# Patient Record
Sex: FEMALE | Race: BLACK OR AFRICAN AMERICAN | NOT HISPANIC OR LATINO | Employment: OTHER | URBAN - METROPOLITAN AREA
[De-identification: names, ages, dates, MRNs, and addresses within clinical notes are randomized per-mention and may not be internally consistent; named-entity substitution may affect disease eponyms.]

---

## 2017-01-01 ENCOUNTER — HOSPITAL ENCOUNTER (INPATIENT)
Facility: HOSPITAL | Age: 72
LOS: 1 days | DRG: 064 | End: 2017-02-20
Attending: EMERGENCY MEDICINE | Admitting: HOSPITALIST
Payer: COMMERCIAL

## 2017-01-01 VITALS
HEART RATE: 70 BPM | TEMPERATURE: 96 F | BODY MASS INDEX: 24.47 KG/M2 | RESPIRATION RATE: 18 BRPM | SYSTOLIC BLOOD PRESSURE: 91 MMHG | OXYGEN SATURATION: 91 % | DIASTOLIC BLOOD PRESSURE: 80 MMHG | WEIGHT: 155.88 LBS | HEIGHT: 67 IN

## 2017-01-01 DIAGNOSIS — I63.9 CVA (CEREBRAL VASCULAR ACCIDENT): ICD-10-CM

## 2017-01-01 DIAGNOSIS — E80.6 HYPERBILIRUBINEMIA: ICD-10-CM

## 2017-01-01 DIAGNOSIS — I63.9 CEREBROVASCULAR ACCIDENT (CVA), UNSPECIFIED MECHANISM: Primary | ICD-10-CM

## 2017-01-01 DIAGNOSIS — I63.411 CEREBROVASCULAR ACCIDENT (CVA) DUE TO EMBOLISM OF RIGHT MIDDLE CEREBRAL ARTERY: ICD-10-CM

## 2017-01-01 DIAGNOSIS — I48.91 ATRIAL FIBRILLATION WITH RAPID VENTRICULAR RESPONSE: ICD-10-CM

## 2017-01-01 LAB
ALBUMIN SERPL BCP-MCNC: 2.9 G/DL
ALBUMIN SERPL BCP-MCNC: 3.2 G/DL
ALP SERPL-CCNC: 237 U/L
ALP SERPL-CCNC: 244 U/L
ALT SERPL W/O P-5'-P-CCNC: 162 U/L
ALT SERPL W/O P-5'-P-CCNC: 171 U/L
AMORPH CRY URNS QL MICRO: ABNORMAL
ANION GAP SERPL CALC-SCNC: 19 MMOL/L
ANION GAP SERPL CALC-SCNC: 23 MMOL/L
ANISOCYTOSIS BLD QL SMEAR: ABNORMAL
ANISOCYTOSIS BLD QL SMEAR: ABNORMAL
AORTIC VALVE REGURGITATION: ABNORMAL
APTT BLDCRRT: 25.7 SEC
APTT BLDCRRT: 38.5 SEC
APTT BLDCRRT: >150 SEC
AST SERPL-CCNC: 47 U/L
AST SERPL-CCNC: 74 U/L
BACTERIA #/AREA URNS HPF: ABNORMAL /HPF
BASOPHILS # BLD AUTO: 0.01 K/UL
BASOPHILS # BLD AUTO: 0.02 K/UL
BASOPHILS # BLD AUTO: 0.03 K/UL
BASOPHILS NFR BLD: 0.1 %
BASOPHILS NFR BLD: 0.3 %
BASOPHILS NFR BLD: 0.5 %
BILIRUB SERPL-MCNC: 3.1 MG/DL
BILIRUB SERPL-MCNC: 3.5 MG/DL
BILIRUB UR QL STRIP: NEGATIVE
BUN SERPL-MCNC: 21 MG/DL
BUN SERPL-MCNC: 21 MG/DL
CALCIUM SERPL-MCNC: 8.5 MG/DL
CALCIUM SERPL-MCNC: 9.4 MG/DL
CHLORIDE SERPL-SCNC: 109 MMOL/L
CHLORIDE SERPL-SCNC: 113 MMOL/L
CHLORIDE UR-SCNC: <20 MMOL/L
CHOLEST/HDLC SERPL: 5.5 {RATIO}
CLARITY UR: ABNORMAL
CO2 SERPL-SCNC: 15 MMOL/L
CO2 SERPL-SCNC: 7 MMOL/L
COLOR UR: ABNORMAL
CREAT SERPL-MCNC: 1.6 MG/DL
CREAT SERPL-MCNC: 1.7 MG/DL
CREAT UR-MCNC: 289 MG/DL
DIFFERENTIAL METHOD: ABNORMAL
EOSINOPHIL # BLD AUTO: 0 K/UL
EOSINOPHIL NFR BLD: 0 %
EOSINOPHIL URNS QL WRIGHT STN: NORMAL
ERYTHROCYTE [DISTWIDTH] IN BLOOD BY AUTOMATED COUNT: 18.6 %
ERYTHROCYTE [DISTWIDTH] IN BLOOD BY AUTOMATED COUNT: 19 %
ERYTHROCYTE [DISTWIDTH] IN BLOOD BY AUTOMATED COUNT: 19.9 %
EST. GFR  (AFRICAN AMERICAN): 34 ML/MIN/1.73 M^2
EST. GFR  (AFRICAN AMERICAN): 37 ML/MIN/1.73 M^2
EST. GFR  (NON AFRICAN AMERICAN): 30 ML/MIN/1.73 M^2
EST. GFR  (NON AFRICAN AMERICAN): 32 ML/MIN/1.73 M^2
ESTIMATED AVG GLUCOSE: 143 MG/DL
ESTIMATED PA SYSTOLIC PRESSURE: 54.94
GLUCOSE SERPL-MCNC: 160 MG/DL
GLUCOSE SERPL-MCNC: 217 MG/DL
GLUCOSE UR QL STRIP: NEGATIVE
HBA1C MFR BLD HPLC: 6.6 %
HCT VFR BLD AUTO: 36.4 %
HCT VFR BLD AUTO: 38.7 %
HCT VFR BLD AUTO: 39.7 %
HDL/CHOLESTEROL RATIO: 18.1 %
HDLC SERPL-MCNC: 13 MG/DL
HDLC SERPL-MCNC: 72 MG/DL
HGB BLD-MCNC: 12.9 G/DL
HGB BLD-MCNC: 12.9 G/DL
HGB BLD-MCNC: 13 G/DL
HGB UR QL STRIP: NEGATIVE
HYALINE CASTS #/AREA URNS LPF: 10 /LPF
INR PPP: 1.4
INR PPP: 1.6
INR PPP: 1.8
KETONES UR QL STRIP: NEGATIVE
LDLC SERPL CALC-MCNC: 42.4 MG/DL
LEUKOCYTE ESTERASE UR QL STRIP: NEGATIVE
LYMPHOCYTES # BLD AUTO: 0.9 K/UL
LYMPHOCYTES # BLD AUTO: 0.9 K/UL
LYMPHOCYTES # BLD AUTO: 1 K/UL
LYMPHOCYTES NFR BLD: 10.7 %
LYMPHOCYTES NFR BLD: 12.8 %
LYMPHOCYTES NFR BLD: 15.7 %
MCH RBC QN AUTO: 23.1 PG
MCH RBC QN AUTO: 23.2 PG
MCH RBC QN AUTO: 23.5 PG
MCHC RBC AUTO-ENTMCNC: 32.7 %
MCHC RBC AUTO-ENTMCNC: 33.3 %
MCHC RBC AUTO-ENTMCNC: 35.4 %
MCV RBC AUTO: 66 FL
MCV RBC AUTO: 69 FL
MCV RBC AUTO: 71 FL
MICROSCOPIC COMMENT: ABNORMAL
MITRAL VALVE REGURGITATION: ABNORMAL
MONOCYTES # BLD AUTO: 0.4 K/UL
MONOCYTES # BLD AUTO: 0.4 K/UL
MONOCYTES # BLD AUTO: 0.6 K/UL
MONOCYTES NFR BLD: 6.3 %
MONOCYTES NFR BLD: 6.4 %
MONOCYTES NFR BLD: 7.8 %
NEUTROPHILS # BLD AUTO: 4.7 K/UL
NEUTROPHILS # BLD AUTO: 5.5 K/UL
NEUTROPHILS # BLD AUTO: 6.6 K/UL
NEUTROPHILS NFR BLD: 77.5 %
NEUTROPHILS NFR BLD: 80.5 %
NEUTROPHILS NFR BLD: 82.1 %
NITRITE UR QL STRIP: NEGATIVE
NONHDLC SERPL-MCNC: 59 MG/DL
OSMOLALITY SERPL: 308 MOSM/KG
OSMOLALITY UR: 605 MOSM/KG
OVALOCYTES BLD QL SMEAR: ABNORMAL
OVALOCYTES BLD QL SMEAR: ABNORMAL
PH UR STRIP: 5 [PH] (ref 5–8)
PLATELET # BLD AUTO: 194 K/UL
PLATELET # BLD AUTO: 194 K/UL
PLATELET # BLD AUTO: 271 K/UL
PLATELET # BLD AUTO: 310 K/UL
PLATELET BLD QL SMEAR: ABNORMAL
PLATELET BLD QL SMEAR: ABNORMAL
PMV BLD AUTO: ABNORMAL FL
PMV BLD AUTO: NORMAL FL
POCT GLUCOSE: 140 MG/DL (ref 70–110)
POCT GLUCOSE: 188 MG/DL (ref 70–110)
POCT GLUCOSE: 198 MG/DL (ref 70–110)
POCT GLUCOSE: 246 MG/DL (ref 70–110)
POIKILOCYTOSIS BLD QL SMEAR: ABNORMAL
POIKILOCYTOSIS BLD QL SMEAR: ABNORMAL
POLYCHROMASIA BLD QL SMEAR: ABNORMAL
POLYCHROMASIA BLD QL SMEAR: ABNORMAL
POTASSIUM SERPL-SCNC: 4 MMOL/L
POTASSIUM SERPL-SCNC: 4.5 MMOL/L
POTASSIUM UR-SCNC: 57 MMOL/L
PROT SERPL-MCNC: 6.3 G/DL
PROT SERPL-MCNC: 6.6 G/DL
PROT UR QL STRIP: ABNORMAL
PROT UR-MCNC: 78 MG/DL
PROT/CREAT RATIO, UR: 0.27
PROTHROMBIN TIME: 14.1 SEC
PROTHROMBIN TIME: 16.4 SEC
PROTHROMBIN TIME: 19.1 SEC
RBC # BLD AUTO: 5.5 M/UL
RBC # BLD AUTO: 5.58 M/UL
RBC # BLD AUTO: 5.6 M/UL
RBC #/AREA URNS HPF: 5 /HPF (ref 0–4)
RETIRED EF AND QEF - SEE NOTES: 15 (ref 55–65)
SODIUM SERPL-SCNC: 143 MMOL/L
SODIUM SERPL-SCNC: 143 MMOL/L
SODIUM UR-SCNC: 41 MMOL/L
SP GR UR STRIP: 1.02 (ref 1–1.03)
TRICUSPID VALVE REGURGITATION: ABNORMAL
TRIGL SERPL-MCNC: 83 MG/DL
TROPONIN I SERPL DL<=0.01 NG/ML-MCNC: <0.006 NG/ML
TSH SERPL DL<=0.005 MIU/L-ACNC: 3.19 UIU/ML
URN SPEC COLLECT METH UR: ABNORMAL
UROBILINOGEN UR STRIP-ACNC: ABNORMAL EU/DL
UUN UR-MCNC: 800 MG/DL
WBC # BLD AUTO: 6.04 K/UL
WBC # BLD AUTO: 6.86 K/UL
WBC # BLD AUTO: 8.12 K/UL
WBC #/AREA URNS HPF: 5 /HPF (ref 0–5)

## 2017-01-01 PROCEDURE — 63600175 PHARM REV CODE 636 W HCPCS: Performed by: HOSPITALIST

## 2017-01-01 PROCEDURE — 25000003 PHARM REV CODE 250: Performed by: EMERGENCY MEDICINE

## 2017-01-01 PROCEDURE — 84484 ASSAY OF TROPONIN QUANT: CPT

## 2017-01-01 PROCEDURE — 83036 HEMOGLOBIN GLYCOSYLATED A1C: CPT

## 2017-01-01 PROCEDURE — 63600175 PHARM REV CODE 636 W HCPCS: Performed by: EMERGENCY MEDICINE

## 2017-01-01 PROCEDURE — 99285 EMERGENCY DEPT VISIT HI MDM: CPT

## 2017-01-01 PROCEDURE — 96365 THER/PROPH/DIAG IV INF INIT: CPT

## 2017-01-01 PROCEDURE — 85730 THROMBOPLASTIN TIME PARTIAL: CPT

## 2017-01-01 PROCEDURE — 96361 HYDRATE IV INFUSION ADD-ON: CPT | Mod: 59

## 2017-01-01 PROCEDURE — 27000221 HC OXYGEN, UP TO 24 HOURS

## 2017-01-01 PROCEDURE — 84133 ASSAY OF URINE POTASSIUM: CPT

## 2017-01-01 PROCEDURE — 99223 1ST HOSP IP/OBS HIGH 75: CPT | Mod: ,,, | Performed by: INTERNAL MEDICINE

## 2017-01-01 PROCEDURE — 93306 TTE W/DOPPLER COMPLETE: CPT | Mod: 26,,, | Performed by: INTERNAL MEDICINE

## 2017-01-01 PROCEDURE — 85730 THROMBOPLASTIN TIME PARTIAL: CPT | Mod: 91

## 2017-01-01 PROCEDURE — 96375 TX/PRO/DX INJ NEW DRUG ADDON: CPT

## 2017-01-01 PROCEDURE — 96360 HYDRATION IV INFUSION INIT: CPT | Mod: 59

## 2017-01-01 PROCEDURE — 84540 ASSAY OF URINE/UREA-N: CPT

## 2017-01-01 PROCEDURE — P9612 CATHETERIZE FOR URINE SPEC: HCPCS

## 2017-01-01 PROCEDURE — 99222 1ST HOSP IP/OBS MODERATE 55: CPT | Mod: ,,, | Performed by: PSYCHIATRY & NEUROLOGY

## 2017-01-01 PROCEDURE — 81000 URINALYSIS NONAUTO W/SCOPE: CPT

## 2017-01-01 PROCEDURE — 84300 ASSAY OF URINE SODIUM: CPT

## 2017-01-01 PROCEDURE — 93306 TTE W/DOPPLER COMPLETE: CPT

## 2017-01-01 PROCEDURE — 20000000 HC ICU ROOM

## 2017-01-01 PROCEDURE — 84443 ASSAY THYROID STIM HORMONE: CPT

## 2017-01-01 PROCEDURE — 85610 PROTHROMBIN TIME: CPT | Mod: 91

## 2017-01-01 PROCEDURE — 84156 ASSAY OF PROTEIN URINE: CPT

## 2017-01-01 PROCEDURE — 82436 ASSAY OF URINE CHLORIDE: CPT

## 2017-01-01 PROCEDURE — 80053 COMPREHEN METABOLIC PANEL: CPT

## 2017-01-01 PROCEDURE — B548ZZA ULTRASONOGRAPHY OF SUPERIOR VENA CAVA, GUIDANCE: ICD-10-PCS | Performed by: HOSPITALIST

## 2017-01-01 PROCEDURE — 36415 COLL VENOUS BLD VENIPUNCTURE: CPT

## 2017-01-01 PROCEDURE — 87205 SMEAR GRAM STAIN: CPT

## 2017-01-01 PROCEDURE — 25000003 PHARM REV CODE 250: Performed by: HOSPITALIST

## 2017-01-01 PROCEDURE — 85025 COMPLETE CBC W/AUTO DIFF WBC: CPT | Mod: 91

## 2017-01-01 PROCEDURE — 85610 PROTHROMBIN TIME: CPT

## 2017-01-01 PROCEDURE — 80061 LIPID PANEL: CPT

## 2017-01-01 PROCEDURE — 83930 ASSAY OF BLOOD OSMOLALITY: CPT

## 2017-01-01 PROCEDURE — 94761 N-INVAS EAR/PLS OXIMETRY MLT: CPT

## 2017-01-01 PROCEDURE — 96366 THER/PROPH/DIAG IV INF ADDON: CPT

## 2017-01-01 PROCEDURE — 02HV33Z INSERTION OF INFUSION DEVICE INTO SUPERIOR VENA CAVA, PERCUTANEOUS APPROACH: ICD-10-PCS | Performed by: HOSPITALIST

## 2017-01-01 PROCEDURE — 25000003 PHARM REV CODE 250: Performed by: PSYCHIATRY & NEUROLOGY

## 2017-01-01 PROCEDURE — 82962 GLUCOSE BLOOD TEST: CPT

## 2017-01-01 PROCEDURE — 80053 COMPREHEN METABOLIC PANEL: CPT | Mod: 91

## 2017-01-01 PROCEDURE — 83935 ASSAY OF URINE OSMOLALITY: CPT

## 2017-01-01 PROCEDURE — 36569 INSJ PICC 5 YR+ W/O IMAGING: CPT

## 2017-01-01 RX ORDER — INSULIN ASPART 100 [IU]/ML
1-12 INJECTION, SOLUTION INTRAVENOUS; SUBCUTANEOUS
Status: DISCONTINUED | OUTPATIENT
Start: 2017-01-01 | End: 2017-01-01 | Stop reason: HOSPADM

## 2017-01-01 RX ORDER — HEPARIN SODIUM,PORCINE/D5W 25000/250
16 INTRAVENOUS SOLUTION INTRAVENOUS CONTINUOUS
Status: DISCONTINUED | OUTPATIENT
Start: 2017-01-01 | End: 2017-01-01 | Stop reason: HOSPADM

## 2017-01-01 RX ORDER — GLUCAGON 1 MG
1 KIT INJECTION
Status: DISCONTINUED | OUTPATIENT
Start: 2017-01-01 | End: 2017-01-01 | Stop reason: HOSPADM

## 2017-01-01 RX ORDER — NOREPINEPHRINE BITARTRATE/D5W 4MG/250ML
0.02 PLASTIC BAG, INJECTION (ML) INTRAVENOUS CONTINUOUS
Status: DISCONTINUED | OUTPATIENT
Start: 2017-01-01 | End: 2017-01-01

## 2017-01-01 RX ORDER — CARVEDILOL 6.25 MG/1
12.5 TABLET ORAL 2 TIMES DAILY WITH MEALS
Status: DISCONTINUED | OUTPATIENT
Start: 2017-01-01 | End: 2017-01-01

## 2017-01-01 RX ORDER — SODIUM CHLORIDE 9 MG/ML
INJECTION, SOLUTION INTRAVENOUS CONTINUOUS
Status: DISCONTINUED | OUTPATIENT
Start: 2017-01-01 | End: 2017-01-01 | Stop reason: HOSPADM

## 2017-01-01 RX ORDER — AMIODARONE HYDROCHLORIDE 150 MG/3ML
150 INJECTION, SOLUTION INTRAVENOUS
Status: COMPLETED | OUTPATIENT
Start: 2017-01-01 | End: 2017-01-01

## 2017-01-01 RX ORDER — SODIUM CHLORIDE, SODIUM LACTATE, POTASSIUM CHLORIDE, CALCIUM CHLORIDE 600; 310; 30; 20 MG/100ML; MG/100ML; MG/100ML; MG/100ML
1000 INJECTION, SOLUTION INTRAVENOUS CONTINUOUS
Status: DISCONTINUED | OUTPATIENT
Start: 2017-01-01 | End: 2017-01-01

## 2017-01-01 RX ORDER — ONDANSETRON 2 MG/ML
8 INJECTION INTRAMUSCULAR; INTRAVENOUS EVERY 8 HOURS PRN
Status: DISCONTINUED | OUTPATIENT
Start: 2017-01-01 | End: 2017-01-01 | Stop reason: HOSPADM

## 2017-01-01 RX ORDER — INSULIN ASPART 100 [IU]/ML
1-10 INJECTION, SOLUTION INTRAVENOUS; SUBCUTANEOUS EVERY 6 HOURS PRN
Status: DISCONTINUED | OUTPATIENT
Start: 2017-01-01 | End: 2017-01-01

## 2017-01-01 RX ORDER — ACETAMINOPHEN 325 MG/1
650 TABLET ORAL EVERY 6 HOURS PRN
Status: DISCONTINUED | OUTPATIENT
Start: 2017-01-01 | End: 2017-01-01 | Stop reason: HOSPADM

## 2017-01-01 RX ORDER — ATORVASTATIN CALCIUM 10 MG/1
40 TABLET, FILM COATED ORAL DAILY
Status: DISCONTINUED | OUTPATIENT
Start: 2017-01-01 | End: 2017-01-01

## 2017-01-01 RX ORDER — AMOXICILLIN 250 MG
1 CAPSULE ORAL 2 TIMES DAILY
Status: DISCONTINUED | OUTPATIENT
Start: 2017-01-01 | End: 2017-01-01 | Stop reason: HOSPADM

## 2017-01-01 RX ORDER — LABETALOL HYDROCHLORIDE 5 MG/ML
10 INJECTION, SOLUTION INTRAVENOUS
Status: DISCONTINUED | OUTPATIENT
Start: 2017-01-01 | End: 2017-01-01 | Stop reason: HOSPADM

## 2017-01-01 RX ORDER — HEPARIN SODIUM 5000 [USP'U]/ML
5000 INJECTION, SOLUTION INTRAVENOUS; SUBCUTANEOUS EVERY 8 HOURS
Status: DISCONTINUED | OUTPATIENT
Start: 2017-01-01 | End: 2017-01-01

## 2017-01-01 RX ORDER — ASPIRIN 600 MG/1
300 SUPPOSITORY RECTAL
Status: COMPLETED | OUTPATIENT
Start: 2017-01-01 | End: 2017-01-01

## 2017-01-01 RX ORDER — SODIUM CHLORIDE 0.9 % (FLUSH) 0.9 %
3 SYRINGE (ML) INJECTION EVERY 8 HOURS
Status: DISCONTINUED | OUTPATIENT
Start: 2017-01-01 | End: 2017-01-01

## 2017-01-01 RX ORDER — LEVALBUTEROL INHALATION SOLUTION 0.63 MG/3ML
0.63 SOLUTION RESPIRATORY (INHALATION) EVERY 8 HOURS
Status: DISCONTINUED | OUTPATIENT
Start: 2017-01-01 | End: 2017-01-01 | Stop reason: HOSPADM

## 2017-01-01 RX ADMIN — SODIUM CHLORIDE 1000 ML: 0.9 INJECTION, SOLUTION INTRAVENOUS at 09:02

## 2017-01-01 RX ADMIN — ASPIRIN 300 MG: 600 SUPPOSITORY RECTAL at 04:02

## 2017-01-01 RX ADMIN — Medication 0.02 MCG/KG/MIN: at 10:02

## 2017-01-01 RX ADMIN — SODIUM CHLORIDE, SODIUM LACTATE, POTASSIUM CHLORIDE, AND CALCIUM CHLORIDE 1000 ML: .6; .31; .03; .02 INJECTION, SOLUTION INTRAVENOUS at 04:02

## 2017-01-01 RX ADMIN — INSULIN ASPART 2 UNITS: 100 INJECTION, SOLUTION INTRAVENOUS; SUBCUTANEOUS at 05:02

## 2017-01-01 RX ADMIN — HEPARIN SODIUM 5000 UNITS: 5000 INJECTION, SOLUTION INTRAVENOUS; SUBCUTANEOUS at 07:02

## 2017-01-01 RX ADMIN — SODIUM CHLORIDE, PRESERVATIVE FREE 3 ML: 5 INJECTION INTRAVENOUS at 05:02

## 2017-01-01 RX ADMIN — NOREPINEPHRINE BITARTRATE 1 MCG/KG/MIN: 1 INJECTION INTRAVENOUS at 02:02

## 2017-01-01 RX ADMIN — NOREPINEPHRINE BITARTRATE 0.05 MCG/KG/MIN: 1 INJECTION INTRAVENOUS at 01:02

## 2017-01-01 RX ADMIN — AMIODARONE HYDROCHLORIDE 0.5 MG/MIN: 1.8 INJECTION, SOLUTION INTRAVENOUS at 08:02

## 2017-01-01 RX ADMIN — Medication 1 MCG/KG/MIN: at 12:02

## 2017-01-01 RX ADMIN — AMIODARONE HYDROCHLORIDE 150 MG: 50 INJECTION, SOLUTION INTRAVENOUS at 02:02

## 2017-01-01 RX ADMIN — SODIUM CHLORIDE: 0.9 INJECTION, SOLUTION INTRAVENOUS at 10:02

## 2017-01-01 RX ADMIN — AMIODARONE HYDROCHLORIDE 1 MG/MIN: 1.8 INJECTION, SOLUTION INTRAVENOUS at 04:02

## 2017-01-01 RX ADMIN — SODIUM CHLORIDE 1000 ML: 0.9 INJECTION, SOLUTION INTRAVENOUS at 03:02

## 2017-01-01 RX ADMIN — NOREPINEPHRINE BITARTRATE 3 MCG/KG/MIN: 1 INJECTION INTRAVENOUS at 05:02

## 2017-01-01 RX ADMIN — INSULIN ASPART 2 UNITS: 100 INJECTION, SOLUTION INTRAVENOUS; SUBCUTANEOUS at 07:02

## 2017-01-01 RX ADMIN — HEPARIN SODIUM AND DEXTROSE 16 UNITS/KG/HR: 10000; 5 INJECTION INTRAVENOUS at 12:02

## 2017-01-01 RX ADMIN — SODIUM CHLORIDE 1000 ML: 0.9 INJECTION, SOLUTION INTRAVENOUS at 02:02

## 2017-01-01 RX ADMIN — AMIODARONE HYDROCHLORIDE 1 MG/MIN: 1.8 INJECTION, SOLUTION INTRAVENOUS at 02:02

## 2017-02-19 PROBLEM — N17.9 ACUTE RENAL FAILURE: Status: ACTIVE | Noted: 2017-01-01

## 2017-02-19 PROBLEM — I63.9 CEREBROVASCULAR ACCIDENT (CVA): Status: ACTIVE | Noted: 2017-01-01

## 2017-02-19 PROBLEM — I48.91 ATRIAL FIBRILLATION WITH RVR: Status: ACTIVE | Noted: 2017-01-01

## 2017-02-19 PROBLEM — I63.411 CEREBROVASCULAR ACCIDENT (CVA) DUE TO EMBOLISM OF RIGHT MIDDLE CEREBRAL ARTERY: Status: ACTIVE | Noted: 2017-01-01

## 2017-02-19 PROBLEM — I50.32 DIASTOLIC DYSFUNCTION WITH CHRONIC HEART FAILURE: Status: ACTIVE | Noted: 2017-01-01

## 2017-02-19 NOTE — ED TRIAGE NOTES
73 yo female comes to ED via EMS after being found down by daughter. Pt has left sided weakness with slurred speech and right sided gaze. Pt does have history of 2 previous CVA but family states that she had no deficits after. Pt is able to follow commands but speech is incoherent.

## 2017-02-19 NOTE — ASSESSMENT & PLAN NOTE
· BG not in acceptable range at this time  · Maintain w/ subcutaneous insulin management order set  · Hold oral diabetic meds  · ADA 1800 kcal diet  · BG goal while in patient is <180mg/dL  · HgA1c = Pending

## 2017-02-19 NOTE — PT/OT/SLP PROGRESS
Physical Therapy      Elsa Joel  MRN: [id number]    Patient not seen today secondary to nurse stating that patient currently on heparin and amio drip. Will follow-up again tomorrow.    Cheryl Richards, PT, MOT

## 2017-02-19 NOTE — ASSESSMENT & PLAN NOTE
"  Cerebral vascular accident   Right brain etiology as evidenced by physical exam and history  · Stroke risks include medication noncompliance with amiodarone and rivaroxaban in addition to previous CVAs with a CHADSVasc 2 score of 5.  · Symptoms of a dense left-sided hemiparesis  · Initiate stroke protocol  · Trend NIH stroke scale  · STAT CT of head performed with the following findings: "CT Head 2/19/2017:  There is no evidence of intracranial hemorrhage.  There is encephalomalacia in the left MCA distribution.  Area of encephalomalacia is also noted in the right anterior cerebral artery territory.  There is a lacunar type infarct in the left corona radiata.  There is mild loss of gray-white differentiation in the right subinsular cortex and right basal ganglia.  The proximal right middle cerebral artery appears dense.  The reminder of the gray-white differentiation is maintained. "  · Obtain MRI brain and if not revealing, then MRA or CTA of head and neck to look at posterior circulation  · Bilateral carotid ultrasound  · 2-D echo with bubble study if this has not been completed before   · Obtain fasting lipids  · Statin therapy: Atorvastatin- 60 mg oral daily  · TSH, FT3, FT4  · RPR  · HgA1c  · B12  · Folate  · Hemoccult  · Give Lovenox, ASA  · Seizures precaution  · Ativan 1 mg PRN Seizures / call neurologist after first dose  · EEG As needed   · Hold ACEi, beta-blocker, etc while allowing permissive hypertension per stroke protocol  · Stroke education given and patient educated about both stroke prevention and symptoms to be mindful of. The patient was instructed to dial 911 for any signs or symptoms of stroke such as sudden weakness, numbness, dizziness, speech, gait, or visual changes  · Consult neurology for further evaluation and recommendations  "

## 2017-02-19 NOTE — ASSESSMENT & PLAN NOTE
(Permissive due to CVA)  · Allow for permissive HTN in first 24-48 hours  · Thereafter goal while inpatient is a systolic blood pressure less than 140mmHg  · BP in acceptable range at this time  · Continue current home regimen with hold parameters  · PRN antihypertensives available

## 2017-02-19 NOTE — ASSESSMENT & PLAN NOTE
· Currently rate controlled on amiodarone drip; patient was supposed to be taking oral amiodarone but seems to have been off her medication for some unknown reason.  · Maintain with beta-blocker with hold parameters  · Monitor on telemetry  · Maintain magnesium around 2.0  · Maintain potassium around 4.0  _______________________________________  · CHF - yes, 1 - Severely depressed left ventricular systolic function (EF 20-25%) also with evidence of valvulopathy on last Echo   · Hypertension - yes  · Age >74 - no  · DM - yes  · Prior stroke or TIA - yes    CHADS2 score = 5    Oral anticoagulation is strongly advised with a score of greater than 1.  Patient was prescribed rivaroxaban but her INR is subtherapeutic and it is reported that she was not taking this as an outpatient recently.  This likely contributory to her acute CVA as noted above.

## 2017-02-19 NOTE — PROGRESS NOTES
Patient seen and examined.  H&P noted.  The patient remains aphasic and agitated with stimulation.  She has a dense left-sided hemiplegia and cannot follow commands.  Will hold all oral medications for now and continue IV fluids to maintain appropriate perfusion pressures.  Continue Neuro checks and await Neurology evaluation.

## 2017-02-19 NOTE — ASSESSMENT & PLAN NOTE
History of Coronary artery disease  · No evidence of acute coronary syndrome at this time  · Maintain adequate blood pressure control  · Heart healthy diet  · Aspirin  · Statin

## 2017-02-19 NOTE — ASSESSMENT & PLAN NOTE
Acute renal failure  · As evidenced by decrease in GFR.  Baseline creatinine = 1.1  · Will evaluate for pre-renal, intrarenal, and post-obstructive etiology.  · Obtain:  1.  protein/creatinine ratio  2. urine and serum osmolalities  3. urine electrolytes (Na, Cl, K)  4. LDH  5. Complement  6. Chapman's stain for osinophils  · Renal ultrasound  · Monitor with serial Cr / GFR levels closely with serial labs  · Avoid nephrotoxic medications such as NSAIDs, IV contrast, or RAAS blockade  · Nephrology consult if GFR does not improve after IV fluid administration

## 2017-02-19 NOTE — PROCEDURES
"Elsa Joel is a 72 y.o. female patient.    Temp: 96 °F (35.6 °C) (02/19/17 1245)  Pulse: 94 (02/19/17 1558)  Resp: 15 (02/19/17 1558)  BP: (!) 118/91 (02/19/17 1558)  SpO2: 98 % (02/19/17 1558)  Weight: 70.7 kg (155 lb 13.8 oz) (02/19/17 0500)  Height: 5' 7" (170.2 cm) (02/19/17 0500)    PICC  Date/Time: 2/19/2017 4:29 PM  Performed by: LEONIDES OCHOA  Consent Done: Yes  Time out: Immediately prior to procedure a time out was called to verify the correct patient, procedure, equipment, support staff and site/side marked as required  Indications: med administration and vascular access  Anesthesia: local infiltration  Local anesthetic: lidocaine 1% without epinephrine  Anesthetic Total (mL): 5  Description of findings: picc  Preparation: skin prepped with ChloraPrep  Skin prep agent dried: skin prep agent completely dried prior to procedure  Sterile barriers: all five maximum sterile barriers used - cap, mask, sterile gown, sterile gloves, and large sterile sheet  Hand hygiene: hand hygiene performed prior to central venous catheter insertion  Location details: right basilic  Catheter type: double lumen  Catheter size: 5 Fr  Catheter Length: 36cm    Ultrasound guidance: yes  Vessel Caliber: medium and patent, compressibility normal  Vascular Doppler: not done  Needle advanced into vessel with real time Ultrasound guidance.  Guidewire confirmed in vessel.  Sterile sheath used.  no esophageal manometryNumber of attempts: 1  Post-procedure: blood return through all ports, chlorhexidine patch and sterile dressing applied  Estimated blood loss (mL): 0  Specimens: No  Implants: No  Assessment: placement verified by x-ray, tip termination and successful placement  Complications: none        Leonides Ochoa  2/19/2017  "

## 2017-02-19 NOTE — NURSING
0700 Report received and care assumed. 0730 Initial assessment completed see flow sheet. Patient with eyes closed but arousable to tactile and verbal stimuli. Patient lethargic but can follow commands with right side. Speech is very garble and incomprehensible but patient can understand some of what you say. Patient urine output declining and Dr. Wan notified of condition.    0930 Blood pressure falling and orders recieved  For bolus of normal saline.    1000 Dr. Cai here and Dr. Wan   With family at bedside. DNR orders received. Levophed started to keep blood pressure systolic above 100. Patient remains with no urine output.    1200 Dr. Ríos here to see patient aware that MRI had been cancelled due to pacemaker and amio drip.    1500 renal ultra sound , cardiac echo, and carotid ultra sound of neck completed.    1600 Dr. Wan spoke with family about prognosis and PICC line placed in right upper arm and chest xray completed.    1700 FAMILY AT [facility] UPDATE GIVEN

## 2017-02-19 NOTE — H&P
[facility] Medicine  History & Physical    Patient Name: Elsa Joel  MRN: [id number]  Admission Date: 02/19/2017  Attending Physician: Gurinder Pratt MD, MPH      PCP:     DOROTHY Garza    CC:     Chief Complaint   Patient presents with    Stroke Like Symptoms     EMS states family found pt on floor PTA.  EMS reports slurred speech, R sided gaze and flaccid on R side. Family reports 2 prior strokes.  Pt last seen normal at 7:30 PM.        HISTORY OF PRESENT ILLNESS:     Elsa Joel is a 72 y.o. female that (in part)  has a past medical history of Diabetes mellitus and Hypertension. Presents to [facility] Emergency Department after being found down in the field.  According to EMS reports the patient was lying on the floor on her right side with the family member found her.  She had new onset left-sided hemiparesis and dysarthric speech.  Last known normal time was approximately 6-8 hours prior to admission.  She denied a history of 2 prior CVAs.  She was on rivaroxaban and amiodarone, but the patient has not been taking them.  Prior to that she was ambulatory at home.  The batteries in her AICD are dead and they were scheduled to be changed 5 days from now.  The history is otherwise limited due to the condition of the patient.     In the emergency department routine laboratory studies, head CT, and EKG were obtained.  There was evidence of right CVA in the middle cerebral artery territory.  The EKG revealed atrial fibrillation with rapid ventricular response.  She was dysarthric and unable to provide any significant history.  She is able to follow simple one-step commands.    Hospital medicine has been asked to admit for further evaluation and treatment.       REVIEW OF SYSTEMS:     The available review of system is addressed in the HPI and is otherwise limited due to the condition of the patient.     PAST MEDICAL / SURGICAL HISTORY:     Past Medical  History   Diagnosis Date    Diabetes mellitus     Hypertension      Past Surgical History   Procedure Laterality Date    Cardiac defibrillator placement Left 2011         FAMILY HISTORY:     History reviewed. No pertinent family history.      SOCIAL HISTORY:     Social History   Substance Use Topics    Smoking status: Never Smoker    Smokeless tobacco: None    Alcohol use No     Social History     Social History    Marital status:      Spouse name: N/A    Number of children: N/A    Years of education: N/A     Social History Main Topics    Smoking status: Never Smoker    Smokeless tobacco: None    Alcohol use No    Drug use: No    Sexual activity: Not Asked     Other Topics Concern    None     Social History Narrative         ALLERGIES:       Review of patient's allergies indicates:  No Known Allergies      HEALTH SCREENING:     -- Prevnar 13 pneumonia vaccine = no evidence of previous vaccination found in the medical record  -- Influenza vaccine = no evidence of current flu vaccination found in the medical record for this flu season      HOME MEDICATIONS:     Prior to Admission medications    Medication Sig Start Date End Date Taking? Authorizing Provider   amlodipine (NORVASC) 10 MG tablet Take 10 mg by mouth once daily.   Yes Historical Provider, MD   aspirin (ECOTRIN) 81 MG EC tablet Take 81 mg by mouth once daily.   Yes Historical Provider, MD   atorvastatin (LIPITOR) 40 MG tablet Take 40 mg by mouth once daily.   Yes Historical Provider, MD   glimepiride (AMARYL) 2 MG tablet Take 2 mg by mouth before breakfast.   Yes Historical Provider, MD   insulin glargine, TOUJEO, (TOUJEO SOLOSTAR) 300 unit/mL (1.5 mL) InPn pen Inject into the skin.   Yes Historical Provider, MD   lisinopril (PRINIVIL,ZESTRIL) 40 MG tablet Take 40 mg by mouth once daily.   Yes Historical Provider, MD   amiodarone (PACERONE) 200 MG Tab Take 1 tablet (200 mg total) by mouth once daily. 11/21/16 11/21/17  Marylou Tamayo,  BARBARA   carvedilol (COREG) 12.5 MG tablet Take 12.5 mg by mouth 2 (two) times daily with meals.    Historical Provider, MD   carvedilol (COREG) 6.25 MG tablet Take 6.25 mg by mouth 2 (two) times daily with meals.    Historical Provider, MD   metformin (GLUCOPHAGE) 1000 MG tablet Take 1,000 mg by mouth 2 (two) times daily with meals.    Historical Provider, MD   rivaroxaban (XARELTO) 15 mg Tab Take 1 tablet (15 mg total) by mouth daily with dinner or evening meal. 11/10/16   Marylou Tamayo PA-C         [facility] MEDICATIONS:     Scheduled Meds:   Continuous Infusions:   lactated ringers 1,000 mL (02/19/17 0410)     PRN Meds:.      PHYSICAL EXAM:     Body mass index is 26.63 kg/(m^2).  Wt Readings from Last 1 Encounters:   02/19/17 0204 77.1 kg (170 lb)       Vitals:    02/19/17 0347 02/19/17 0354 02/19/17 0411 02/19/17 0419   BP: 109/77  91/65 101/68   BP Location:       Patient Position:       BP Method:       Pulse: (!) 116 (!) 120 110 (!) 114   Resp:       SpO2: (!) 92% 100% 95% 99%   Weight:       Height:              -- General appearance: Acute on chronically ill-appearing elderly female who is well developed. appears older than stated age   -- Head: normocephalic, atraumatic   -- Eyes: conjunctivae clear. Extraocular muscles intact  -- Nose: Nares normal. Septum midline.   -- Throat: lips, mucosa, and tongue normal. no throat erythema.   -- Neck: supple, symmetrical, trachea midline, no JVD and thyroid not grossly enlarged, appears symmetric  -- Lungs: clear to auscultation bilaterally. normal respiratory effort. No use of accessory muscles.   -- Chest wall: no tenderness. equal bilateral chest rise   -- Heart: Rapid irregularly irregular rate and rhythm. S1, S2 normal.  no click, rub or gallop   -- Abdomen: soft, non-tender, non-distended, non-tympanic; bowel sounds normal; no masses  -- Extremities: Left-sided hemiparesis.  no cyanosis, clubbing or edema.   -- Pulses: 2+ and symmetric   -- Skin: color  normal, texture normal, turgor normal. No rashes or lesions.   -- Neurologic:  Dysarthric speech.  Dense Left-sided hemiparesis.  Left-sided facial droop (Chronic?),  Fixed Right lateral gaze.  Following one-step commands.  Grossly intact right upper and lower 70s.    LABORATORY STUDIES:     Recent Results (from the past 36 hour(s))   POCT glucose    Collection Time: 02/19/17  2:15 AM   Result Value Ref Range    POCT Glucose 246 (H) 70 - 110 mg/dL   CBC auto differential    Collection Time: 02/19/17  2:18 AM   Result Value Ref Range    WBC 6.04 3.90 - 12.70 K/uL    RBC 5.50 (H) 4.00 - 5.40 M/uL    Hemoglobin 12.9 12.0 - 16.0 g/dL    Hematocrit 36.4 (L) 37.0 - 48.5 %    MCV 66 (L) 82 - 98 fL    MCH 23.5 (L) 27.0 - 31.0 pg    MCHC 35.4 32.0 - 36.0 %    RDW 19.0 (H) 11.5 - 14.5 %    Platelets 310 150 - 350 K/uL    MPV SEE COMMENT 9.2 - 12.9 fL    Gran # 4.7 1.8 - 7.7 K/uL    Lymph # 1.0 1.0 - 4.8 K/uL    Mono # 0.4 0.3 - 1.0 K/uL    Eos # 0.0 0.0 - 0.5 K/uL    Baso # 0.03 0.00 - 0.20 K/uL    Gran% 77.5 (H) 38.0 - 73.0 %    Lymph% 15.7 (L) 18.0 - 48.0 %    Mono% 6.3 4.0 - 15.0 %    Eosinophil% 0.0 0.0 - 8.0 %    Basophil% 0.5 0.0 - 1.9 %    Aniso Moderate     Poik Moderate     Poly Occasional     Ovalocytes Marked     Differential Method Automated    Comprehensive metabolic panel    Collection Time: 02/19/17  2:18 AM   Result Value Ref Range    Sodium 143 136 - 145 mmol/L    Potassium 4.0 3.5 - 5.1 mmol/L    Chloride 109 95 - 110 mmol/L    CO2 15 (L) 23 - 29 mmol/L    Glucose 217 (H) 70 - 110 mg/dL    BUN, Bld 21 8 - 23 mg/dL    Creatinine 1.7 (H) 0.5 - 1.4 mg/dL    Calcium 9.4 8.7 - 10.5 mg/dL    Total Protein 6.6 6.0 - 8.4 g/dL    Albumin 3.2 (L) 3.5 - 5.2 g/dL    Total Bilirubin 3.1 (H) 0.1 - 1.0 mg/dL    Alkaline Phosphatase 244 (H) 55 - 135 U/L    AST 47 (H) 10 - 40 U/L     (H) 10 - 44 U/L    Anion Gap 19 (H) 8 - 16 mmol/L    eGFR if African American 34 (A) >60 mL/min/1.73 m^2    eGFR if non African American  30 (A) >60 mL/min/1.73 m^2   Troponin I    Collection Time: 02/19/17  2:18 AM   Result Value Ref Range    Troponin I <0.006 0.000 - 0.026 ng/mL   APTT    Collection Time: 02/19/17  2:18 AM   Result Value Ref Range    aPTT 25.7 21.0 - 32.0 sec   Protime-INR    Collection Time: 02/19/17  2:18 AM   Result Value Ref Range    Prothrombin Time 14.1 (H) 9.0 - 12.5 sec    INR 1.4 (H) 0.8 - 1.2   TSH    Collection Time: 02/19/17  2:18 AM   Result Value Ref Range    TSH 3.188 0.400 - 4.000 uIU/mL   Urinalysis    Collection Time: 02/19/17  2:42 AM   Result Value Ref Range    Specimen UA Urine, Catheterized     Color, UA Marylou Yellow, Straw, Marylou    Appearance, UA Hazy (A) Clear    pH, UA 5.0 5.0 - 8.0    Specific Gravity, UA 1.020 1.005 - 1.030    Protein, UA 1+ (A) Negative    Glucose, UA Negative Negative    Ketones, UA Negative Negative    Bilirubin (UA) Negative Negative    Occult Blood UA Negative Negative    Nitrite, UA Negative Negative    Urobilinogen, UA 4.0-6.0 (A) <2.0 EU/dL    Leukocytes, UA Negative Negative   Urinalysis Microscopic    Collection Time: 02/19/17  2:42 AM   Result Value Ref Range    RBC, UA 5 (H) 0 - 4 /hpf    WBC, UA 5 0 - 5 /hpf    Bacteria, UA Rare None-Occ /hpf    Hyaline Casts, UA 10 (A) 0-1/lpf /lpf    Amorphous, UA Few None-Moderate    Microscopic Comment SEE COMMENT        Lab Results   Component Value Date    INR 1.4 (H) 02/19/2017     Lab Results   Component Value Date    HGBA1C 7.7 (H) 11/10/2016     Recent Labs      02/19/17   0215   POCTGLUCOSE  246*           IMAGING:     Imaging Results         CT Head Without Contrast (Final result)    Abnormal Result time:  02/19/17 02:31:00    Final result by Porfirio Catalan MD (02/19/17 02:31:00)    Impression:       Mild loss of the gray-white differentiation in the right subinsular cortex and right basal ganglia.  Questionable dense right middle cerebral artery sign.  MRI of the brain may be obtained for further evaluation.    Encephalomalacia in  the right anterior cerebral artery and left middle cerebral artery distributions, consistent with remote infarcts.    Partially calcified 2 cm extra-axial mass in the left superior temporal region, consistent with a meningioma.  Outpatient MRI of the brain with contrast may be obtained for confirmation.    The findings were observed at  02:26:51 on Sunday, February 19, 2017 and discussed with  Dr.JONATHAN BERNAL at 02:26:51 on Sunday, February 19, 2017.        Electronically signed by: NATALY SOMMER MD  Date:     02/19/17  Time:    02:31     Narrative:    Exam: [id number]  02/19/17  02:03:28 [id number] ([facility]) : CT HEAD WITHOUT CONTRAST    Technique:    Axial CT scan of the head was obtained from the vertex to the skull base without intravenous contrast. Coronal and Sagittal reformats were obtained.     Comparison:     None     Findings:      The bony calvarium is intact.  The paranasal sinuses and mastoid air cells are within normal limits.  There are postoperative changes in the orbits.    There are no extra-axial fluid collections.  There is no evidence of intracranial hemorrhage.  There is encephalomalacia in the left MCA distribution.  Area of encephalomalacia is also noted in the right anterior cerebral artery territory.  There is a lacunar type infarct in the left corona radiata.  There is mild loss of gray-white differentiation in the right subinsular cortex and right basal ganglia.  The proximal right middle cerebral artery appears dense.  The reminder of the gray-white differentiation is maintained.      There is a 2 cm partially calcified extra-axial lesion left superior temporal region.  There is no evidence of midline shift.  No evidence of mass effect.                CONSULTS:     IP CONSULT TO NEUROLOGY       ASSESSMENT & PLAN:     Primary Diagnosis:  Cerebrovascular accident (CVA) due to embolism of right middle cerebral artery    Active Hospital Problems    Diagnosis  POA    *Cerebrovascular accident (CVA)  "due to embolism of right middle cerebral artery [I63.411]  Yes     Priority: 1 - High    Atrial fibrillation with RVR [I48.91]  Yes     Priority: 2     Acute renal failure [N17.9]  Yes     Priority: 3     Type 2 diabetes mellitus without complication, without long-term current use of insulin [E11.9]  Yes     Priority: 4     Essential hypertension [I10]  Yes     Priority: 5     Diastolic dysfunction with chronic heart failure [I50.32]  Yes    S/P CABG (coronary artery bypass graft) [Z95.1]  Not Applicable    AICD (automatic cardioverter/defibrillator) present [Z95.810]  Yes    Hyperlipidemia [E78.5]  Yes      Resolved Hospital Problems    Diagnosis Date Resolved POA   No resolved problems to display.         Cerebrovascular accident (CVA) due to embolism of right middle cerebral artery    Cerebral vascular accident   Right brain etiology as evidenced by physical exam and history  · Stroke risks include medication noncompliance with amiodarone and rivaroxaban in addition to previous CVAs with a CHADSVasc 2 score of 5.  · Symptoms of a dense left-sided hemiparesis  · Initiate stroke protocol  · Trend NIH stroke scale  · STAT CT of head performed with the following findings: "CT Head 2/19/2017:  There is no evidence of intracranial hemorrhage.  There is encephalomalacia in the left MCA distribution.  Area of encephalomalacia is also noted in the right anterior cerebral artery territory.  There is a lacunar type infarct in the left corona radiata.  There is mild loss of gray-white differentiation in the right subinsular cortex and right basal ganglia.  The proximal right middle cerebral artery appears dense.  The reminder of the gray-white differentiation is maintained. "  · Obtain MRI brain and if not revealing, then MRA or CTA of head and neck to look at posterior circulation  · Bilateral carotid ultrasound  · 2-D echo with bubble study if this has not been completed before   · Obtain fasting lipids  · Statin " therapy: Atorvastatin- 60 mg oral daily  · TSH, FT3, FT4  · RPR  · HgA1c  · B12  · Folate  · Hemoccult  · Give Lovenox, ASA  · Seizures precaution  · Ativan 1 mg PRN Seizures / call neurologist after first dose  · EEG As needed   · Hold ACEi, beta-blocker, etc while allowing permissive hypertension per stroke protocol  · Stroke education given and patient educated about both stroke prevention and symptoms to be mindful of. The patient was instructed to dial 911 for any signs or symptoms of stroke such as sudden weakness, numbness, dizziness, speech, gait, or visual changes  · Consult neurology for further evaluation and recommendations    Hyperlipidemia  · Lipid panel - pending   · Cardiac diet  · Continue statin        S/P CABG (coronary artery bypass graft)  History of Coronary artery disease  · No evidence of acute coronary syndrome at this time  · Maintain adequate blood pressure control  · Heart healthy diet  · Aspirin  · Statin          AICD (automatic cardioverter/defibrillator) present  · Interrogate  · Dead battery?      Essential hypertension  (Permissive due to CVA)  · Allow for permissive HTN in first 24-48 hours  · Thereafter goal while inpatient is a systolic blood pressure less than 140mmHg  · BP in acceptable range at this time  · Continue current home regimen with hold parameters  · PRN antihypertensives available        Type 2 diabetes mellitus without complication, without long-term current use of insulin  · BG not in acceptable range at this time  · Maintain w/ subcutaneous insulin management order set  · Hold oral diabetic meds  · ADA 1800 kcal diet  · BG goal while in patient is <180mg/dL  · HgA1c = Pending      Acute renal failure  Acute renal failure  · As evidenced by decrease in GFR.  Baseline creatinine = 1.1  · Will evaluate for pre-renal, intrarenal, and post-obstructive etiology.  · Obtain:  6.  protein/creatinine ratio  7. urine and serum osmolalities  8. urine electrolytes (Na, Cl,  K)  9. LDH  10. Complement  11. Chapman's stain for osinophils  · Renal ultrasound  · Monitor with serial Cr / GFR levels closely with serial labs  · Avoid nephrotoxic medications such as NSAIDs, IV contrast, or RAAS blockade  · Nephrology consult if GFR does not improve after IV fluid administration            Atrial fibrillation with RVR  · Currently rate controlled on amiodarone drip; patient was supposed to be taking oral amiodarone but seems to have been off her medication for some unknown reason.  · Maintain with beta-blocker with hold parameters  · Monitor on telemetry  · Maintain magnesium around 2.0  · Maintain potassium around 4.0  _______________________________________  · CHF - yes, 1 - Severely depressed left ventricular systolic function (EF 20-25%) also with evidence of valvulopathy on last Echo   · Hypertension - yes  · Age >74 - no  · DM - yes  · Prior stroke or TIA - yes    CHADS2 score = 5    Oral anticoagulation is strongly advised with a score of greater than 1.  Patient was prescribed rivaroxaban but her INR is subtherapeutic and it is reported that she was not taking this as an outpatient recently.  This likely contributory to her acute CVA as noted above.             VTE Risk Mitigation     None            Adult PRN medications available   DVT prophylaxis given       DISPOSITION:     Will admit to the Hospital Medicine service for further evaluation and treatment.    Chart reviewed and updated where applicable.    High Risk Conditions:  Patient has a condition that poses threat to life and bodily function: Right MCA stroke and atrial fibrillation with RVR      ===============================================================    Gurinder Pratt MD, MPH  Department of Hospital Medicine   [facility]  [phone] pg  (7pm - 6am)        MDM   Reviewed: previous chart and vitals  Reviewed previous: labs, x-ray, CT scan  Interpretation: labs, x-ray, and CT scan    Total  Critical Care time: 39 minutes. This excludes time spent performing separately reportable procedures and services.  Counseling/Conference Time: 15 minutes          This note is dictated using Dragon Medical 360 voice recognition software.  There are word recognition mistakes that are occasionally missed on review.

## 2017-02-19 NOTE — ED PROVIDER NOTES
Encounter Date: 2/19/2017    SCRIBE #1 NOTE: I, Dario Noel , am scribing for, and in the presence of,  Eduardo Chu Jr., MD. I have scribed the following portions of the note - Other sections scribed: HPI/ROS .       History     Chief Complaint   Patient presents with    Stroke Like Symptoms     EMS states family found pt on floor PTA.  EMS reports slurred speech, R sided gaze and flaccid on R side. Family reports 2 prior strokes.  Pt last seen normal at 7:30 PM.      Review of patient's allergies indicates:  No Known Allergies  HPI Comments: CC: Stroke-Like Symptoms     HPI: This 72 y.o. female with HTN, IDDM, and cardiac defibrillator in place presents to the ED via EMS accompanied by a relative, who supplements all hx, for evaluation of new-onset L-sided hemiparesis and speech difficulty that occurred PTA. Family member states she found the pt lying on her R side on the floor, prompting her to activate EMS. Upon arrival to the pt, EMS report slurred speech, a R lateral gaze, and flaccidity to the L side. On exam, pt's eyes are open and is attempting to speak, but is only able to make moaning noises. Relative last saw the pt acting normally around 7:30-8:00 PM (6-6.5 hours ago). Relative notes the pt has had 2 prior strokes in the past and denies any deficits. She states the pt is ambulatory at home. Relative states the has an upcoming appointment to change the batteries of her defibrillator on 2/24/17 (in 5 days). Hx is otherwise limited.       The history is provided by the patient and a relative. The history is limited by the condition of the patient.     Past Medical History   Diagnosis Date    Diabetes mellitus     Hypertension      No past medical history pertinent negatives.  Past Surgical History   Procedure Laterality Date    Cardiac defibrillator placement Left 2011     History reviewed. No pertinent family history.  Social History   Substance Use Topics    Smoking status: Never Smoker     Smokeless tobacco: None    Alcohol use No     Review of Systems   Unable to perform ROS: Acuity of condition   Neurological: Positive for speech difficulty.        (+) L sided hemiparesis       Physical Exam   Initial Vitals   BP Pulse Resp Temp SpO2   -- -- -- -- --            Physical Exam    Nursing note and vitals reviewed.  Constitutional: She appears well-developed and well-nourished. She is not diaphoretic. No distress.   Acutely and chronically ill-appearing female.   HENT:   Head: Normocephalic and atraumatic.   Right Ear: External ear normal.   Left Ear: External ear normal.   Nose: Nose normal.   Mouth/Throat: Oropharynx is clear and moist.   Eyes: Conjunctivae and EOM are normal. Pupils are equal, round, and reactive to light. Right eye exhibits no discharge. Left eye exhibits no discharge. No scleral icterus.   Neck: Normal range of motion. Neck supple.   Cardiovascular: Normal heart sounds and intact distal pulses.   No murmur heard.  Tachycardic, irregularly irregular rhythm.   Pulmonary/Chest: Breath sounds normal. No respiratory distress. She has no wheezes. She has no rhonchi. She has no rales.   Abdominal: Soft. Bowel sounds are normal. She exhibits no distension and no mass. There is no tenderness. There is no rebound and no guarding.   Musculoskeletal: Normal range of motion. She exhibits no edema or tenderness.   Neurological: A cranial nerve deficit is present.   Patient has a left-sided facial droop.  She is unable to speak but makes moaning noises.  Her pupils are equal and reactive bilaterally.  She is a right gaze deviation.  She has no nystagmus.  She has a completely left-sided pilar-paresis.  She is able to follow one-step commands and motor appears to be grossly intact on the right-hand side in the upper and lower extremities.   Skin: Skin is warm and dry. No rash noted. No erythema. No pallor.   Psychiatric: She has a normal mood and affect. Her behavior is normal. Judgment and  thought content normal.         ED Course   Critical Care  Date/Time: 2/19/2017 4:41 AM  Performed by: JAYSHREE BERNAL JR  Authorized by: JAYSHREE BERNAL JR   Direct patient critical care time: 10 minutes  Additional history critical care time: 10 minutes  Ordering / reviewing critical care time: 5 minutes  Documentation critical care time: 5 minutes  Consulting other physicians critical care time: 5 minutes  Consult with family critical care time: 5 minutes  Total critical care time (exclusive of procedural time) : 40 minutes  Critical care was necessary to treat or prevent imminent or life-threatening deterioration of the following conditions: CNS failure or compromise.  Critical care was time spent personally by me on the following activities: development of treatment plan with patient or surrogate, discussions with primary provider, interpretation of cardiac output measurements, evaluation of patient's response to treatment, obtaining history from patient or surrogate, examination of patient, ordering and performing treatments and interventions, ordering and review of laboratory studies, ordering and review of radiographic studies, pulse oximetry, review of old charts and re-evaluation of patient's condition.        Labs Reviewed   CBC W/ AUTO DIFFERENTIAL - Abnormal; Notable for the following:        Result Value    RBC 5.50 (*)     Hematocrit 36.4 (*)     MCV 66 (*)     MCH 23.5 (*)     RDW 19.0 (*)     Gran% 77.5 (*)     Lymph% 15.7 (*)     All other components within normal limits   COMPREHENSIVE METABOLIC PANEL - Abnormal; Notable for the following:     CO2 15 (*)     Glucose 217 (*)     Creatinine 1.7 (*)     Albumin 3.2 (*)     Total Bilirubin 3.1 (*)     Alkaline Phosphatase 244 (*)     AST 47 (*)      (*)     Anion Gap 19 (*)     eGFR if  34 (*)     eGFR if non  30 (*)     All other components within normal limits   PROTIME-INR - Abnormal; Notable for the  following:     Prothrombin Time 14.1 (*)     INR 1.4 (*)     All other components within normal limits   URINALYSIS - Abnormal; Notable for the following:     Appearance, UA Hazy (*)     Protein, UA 1+ (*)     Urobilinogen, UA 4.0-6.0 (*)     All other components within normal limits   URINALYSIS MICROSCOPIC - Abnormal; Notable for the following:     RBC, UA 5 (*)     Hyaline Casts, UA 10 (*)     All other components within normal limits   POCT GLUCOSE - Abnormal; Notable for the following:     POCT Glucose 246 (*)     All other components within normal limits   TROPONIN I   APTT   TSH     EKG Readings: (Independently Interpreted)   Initial Reading: No STEMI.   EKG reviewed by me reveals atrial fibrillation with RVR without any specific ischemic pattern.       X-Rays:   Independently Interpreted Readings:   Other Readings:  CT scan of the head reveals no intracranial hemorrhage or mass effect.  Per radiology read, there is mild loss of the gray-white differentiation in the right subinsular cortex and right basal ganglia.    Medical Decision Making:   ED Management:  This is the emergent evaluation of a 72-year-old female presents the emergency department with left-sided hemiparesis as well as difficulty speaking.  Patient was last seen in her normal self which is without deficits at approximately 7:30 or 8 PM which was 66-1/2 hours ago.  Differential diagnosis at the time of initial evaluation included, but was not limited to:   Ischemic CVA, hemorrhagic CVA, metabolic disturbance, intoxication, sepsis.    This patient has no intracranial hemorrhage on CT scan.  I discussed CT scan with radiology.  Findings consistent with developing ischemic pattern on the right hemisphere.  This is consistent with the patient's presentation of a left hemiparesis.  This is likely a right-sided MCA stroke.  Patient is out of the window for TPA given that she presented approximately 6-6.5 hours after  presumed symptom onset. at this  time, patient is protecting her airway.  She is able to respond to one-step commands.  She is verbalizing but not able to form any words.  Head of the bed is been raised.  She is nothing by mouth.  She will be admitted for evaluation by neurology and for CVA workup.  Patient is in rapid atrial fibrillation and required amiodarone.  She is been intermittently hypotensive requiring IV fluid boluses.  Family updated.  Patient be admitted to the intensive care unit.              Scribe Attestation:   Scribe #1: I performed the above scribed service and the documentation accurately describes the services I performed. I attest to the accuracy of the note.    Attending Attestation:           Physician Attestation for Scribe:  Physician Attestation Statement for Scribe #1: I, Eduardo Chu Jr., MD, reviewed documentation, as scribed by Dario Noel  in my presence, and it is both accurate and complete.                 ED Course     Clinical Impression:   The primary encounter diagnosis was Cerebrovascular accident (CVA), unspecified mechanism. Diagnoses of Atrial fibrillation with rapid ventricular response and Hyperbilirubinemia were also pertinent to this visit.          Eduardo Chu Jr., MD  02/19/17 8032

## 2017-02-19 NOTE — CONSULTS
[facility]  Cardiology  Consult Note    Patient Name: Elsa Joel  MRN: [id number]  Admission Date: 2/19/2017  Hospital Length of Stay: 0 days  Code Status: DNR   Attending Provider: Rosy Wan MD   Consulting Provider: Douglas Cai MD  Primary Care Physician: DOROTHY Garza  Principal Problem:Cerebrovascular accident (CVA) due to embolism of right middle cerebral artery    Patient information was obtained from patient and ER records.     Consults  Subjective:     Chief Complaint:  A-fib     HPI:     HPI: This 72 y.o. female with HTN, IDDM, and cardiac defibrillator in place presents to the ED via EMS accompanied by a relative, who supplements all hx, for evaluation of new-onset L-sided hemiparesis and speech difficulty that occurred PTA. Family member states she found the pt lying on her R side on the floor, prompting her to activate EMS. Upon arrival to the pt, EMS report slurred speech, a R lateral gaze, and flaccidity to the L side. On exam, pt's eyes are open and is attempting to speak, but is only able to make moaning noises. Relative last saw the pt acting normally around 7:30-8:00 PM (6-6.5 hours ago). Relative notes the pt has had 2 prior strokes in the past and denies any deficits. She states the pt is ambulatory at home. Relative states the has an upcoming appointment to change the batteries of her defibrillator on 2/24/17 (in 5 days). Hx is otherwise limited.     Known to me from admission 11/2016  Followed by [facility] cardiology  Echo 11/9/16    1 - Severely depressed left ventricular systolic function (EF 20-25%).     2 - Eccentric hypertrophy.     3 - Mild left ventricular enlargement.     4 - Biatrial enlargement.     5 - Pulmonary hypertension. The estimated PA systolic pressure is 64 mmHg.     6 - Trivial aortic regurgitation.     7 - Moderate mitral regurgitation.     8 - Moderate tricuspid regurgitation.     9 - Mild pulmonic regurgitation.      AICD shock - [employer]  device - interrogation shows PAF with RVR. Will load with amiodarone po. Start OAC with xarelto 15 daily  CAD/CABG - mildly elevated troponin likely from AICD discharges - denies CP  CHF - acute on chronic systolic - diuresis and afterload reduction as tolerated  HTN  DM  Hx CVA    Past Medical History   Diagnosis Date    Diabetes mellitus     Hypertension        Past Surgical History   Procedure Laterality Date    Cardiac defibrillator placement Left 2011       Review of patient's allergies indicates:  No Known Allergies    No current facility-administered medications on file prior to encounter.      Current Outpatient Prescriptions on File Prior to Encounter   Medication Sig    amlodipine (NORVASC) 10 MG tablet Take 10 mg by mouth once daily.    aspirin (ECOTRIN) 81 MG EC tablet Take 81 mg by mouth once daily.    atorvastatin (LIPITOR) 40 MG tablet Take 40 mg by mouth once daily.    glimepiride (AMARYL) 2 MG tablet Take 2 mg by mouth before breakfast.    insulin glargine, TOUJEO, (TOUJEO SOLOSTAR) 300 unit/mL (1.5 mL) InPn pen Inject into the skin.    lisinopril (PRINIVIL,ZESTRIL) 40 MG tablet Take 40 mg by mouth once daily.    amiodarone (PACERONE) 200 MG Tab Take 1 tablet (200 mg total) by mouth once daily.    carvedilol (COREG) 12.5 MG tablet Take 12.5 mg by mouth 2 (two) times daily with meals.    carvedilol (COREG) 6.25 MG tablet Take 6.25 mg by mouth 2 (two) times daily with meals.    metformin (GLUCOPHAGE) 1000 MG tablet Take 1,000 mg by mouth 2 (two) times daily with meals.    rivaroxaban (XARELTO) 15 mg Tab Take 1 tablet (15 mg total) by mouth daily with dinner or evening meal.     Family History     None        Social History Main Topics    Smoking status: Never Smoker    Smokeless tobacco: Not on file    Alcohol use No    Drug use: No    Sexual activity: Not on file     Review of Systems   Unable to perform ROS: acuity of condition     Objective:     Vital Signs (Most Recent):  Temp:  97 °F (36.1 °C) (02/19/17 0800)  Pulse: 100 (02/19/17 1015)  Resp: 14 (02/19/17 1015)  BP: (!) 79/64 (02/19/17 1015)  SpO2: 97 % (02/19/17 1015) Vital Signs (24h Range):  Temp:  [96 °F (35.6 °C)-97 °F (36.1 °C)] 97 °F (36.1 °C)  Pulse:  [100-144] 100  Resp:  [13-28] 14  SpO2:  [69 %-100 %] 97 %  BP: ()/(54-93) 79/64     Weight: 70.7 kg (155 lb 13.8 oz)  Body mass index is 24.41 kg/(m^2).    SpO2: 97 %  O2 Device (Oxygen Therapy): nasal cannula      Intake/Output Summary (Last 24 hours) at 02/19/17 1026  Last data filed at 02/19/17 1000   Gross per 24 hour   Intake          2042.68 ml   Output               40 ml   Net          2002.68 ml       Lines/Drains/Airways     Drain                 Urethral Catheter 02/19/17 0502 Non-latex 16 Fr. less than 1 day          Peripheral Intravenous Line                 Peripheral IV - Single Lumen 02/19/17 0145 Right Antecubital less than 1 day         Peripheral IV - Single Lumen 02/19/17 0218 Right Forearm less than 1 day                Physical Exam   Constitutional: She is oriented to person, place, and time. She appears well-developed and well-nourished.   HENT:   Head: Normocephalic and atraumatic.   Eyes: Conjunctivae are normal. Pupils are equal, round, and reactive to light.   Neck: Normal range of motion. Neck supple.   Cardiovascular: Normal rate, normal heart sounds and intact distal pulses.  An irregularly irregular rhythm present.   Pulmonary/Chest: Effort normal and breath sounds normal.   Abdominal: Soft. Bowel sounds are normal.   Musculoskeletal: Normal range of motion.   Neurological: She is alert and oriented to person, place, and time.   Skin: Skin is warm and dry.       Significant Labs: All pertinent lab results from the last 24 hours have been reviewed.    Significant Imaging: Echocardiogram:   2D echo with color flow doppler:   Results for orders placed or performed during the hospital encounter of 11/09/16   2D echo with color flow doppler   Result  Value Ref Range    EF 20 (A) 55 - 65    Mitral Valve Regurgitation MODERATE (A)     Aortic Valve Regurgitation TRIVIAL     Est. PA Systolic Pressure 64.15 (A)     Tricuspid Valve Regurgitation MODERATE (A)      Assessment and Plan:     Active Diagnoses:    Diagnosis Date Noted POA    PRINCIPAL PROBLEM:  Cerebrovascular accident (CVA) due to embolism of right middle cerebral artery [I63.411] 02/19/2017 Yes    Atrial fibrillation with RVR [I48.91] 02/19/2017 Yes    Acute renal failure [N17.9] 02/19/2017 Yes    Diastolic dysfunction with chronic heart failure [I50.32] 02/19/2017 Yes    Essential hypertension [I10] 11/09/2016 Yes    Type 2 diabetes mellitus without complication, without long-term current use of insulin [E11.9] 11/09/2016 Yes    S/P CABG (coronary artery bypass graft) [Z95.1] 11/09/2016 Not Applicable    AICD (automatic cardioverter/defibrillator) present [Z95.810] 11/09/2016 Yes    Hyperlipidemia [E78.5] 11/09/2016 Yes      Problems Resolved During this Admission:    Diagnosis Date Noted Date Resolved POA       VTE Risk Mitigation         Ordered     High Risk of VTE  Once      02/19/17 0443     Place sequential compression device  Until discontinued      02/19/17 0443        CVA - likely cardio-embolic from A-fib. Noncompliant with xarelto and amiodarone  Ischemic cardiomyopathy  A-fib RVR - rates improved now  CAD/CABG  AICD - at EOL. No plan for generator change with DNR status  CHF - chronic systolic  HTN  DM  ARF    Agree with DNR    Thank you for your consult. I will follow-up with patient. Please contact us if you have any additional questions.    Douglas Cai MD  Cardiology   [facility]

## 2017-02-19 NOTE — PLAN OF CARE
02/19/17 1444   Discharge Assessment   Assessment Type Discharge Planning Assessment   Confirmed/corrected address and phone number on facesheet? Yes   Assessment information obtained from? Caregiver   Type of Healthcare Directive Received (N/A)   Prior to hospitilization cognitive status: Alert/Oriented   Prior to hospitalization functional status: Needs Assistance   Current cognitive status: Unable to Assess   Current Functional Status: Needs Assistance   Arrived From home or self-care   Lives With alone   Able to Return to Prior Arrangements yes  (Patient will ne ss)   Is patient able to care for self after discharge? Unable to determine at this time (comments)   How many people do you have in your home that can help with your care after discharge? 1   Who are your caregiver(s) and their phone number(s)? Daughter, Francine Rivera   Patient's perception of discharge disposition home or selfcare;home health   Readmission Within The Last 30 Days no previous admission in last 30 days   Patient currently being followed by outpatient case management? No   Patient currently receives home health services? No   Does the patient currently use HME? No   Patient currently receives private duty nursing? No   Patient currently receives any other outside agency services? No   Do you have any problems affording any of your prescribed medications? No   Is the patient taking medications as prescribed? yes   Do you have any financial concerns preventing you from receiving the healthcare you need? No   Does the patient have transportation to healthcare appointments? No   Transportation Available family or friend will provide   On Dialysis? No   Does the patient receive services at the Coumadin Clinic? No   Are there any open cases? No   Discharge Plan A Home with family;Home Health   Discharge Plan B Home with family;Home Health   Patient/Family In Agreement With Plan yes

## 2017-02-20 NOTE — PROGRESS NOTES
During hourly rounding, agonal, shallow respirations observed. HR 60's. Unable to obtain NIBP. Levophed infusion at max dose. No escalation of care per family. Pt is DNR. Pt unarousable to vigorous stimulation. Attempted to call pt daughter-in-law, Yue Joel-1st contact, no answer. Unable to leave voice mail-voice mailbox full. Spoke with son, update given - advised he and family will be here soon.  2015 - Several family members at bedside, emotional support provided.    2035 - Pt pronounced by Dr Pratt    2200-Carol called

## 2017-02-20 NOTE — CONSULTS
[facility]  Neurology  Consult Note    Patient Name: Elsa Joel  MRN: [id number]  Admission Date: 2/19/2017  Hospital Length of Stay: 0 days  Code Status: DNR   Attending Provider: Rosy Wan MD   Consulting Provider: Gelacio Ríos MD  Primary Care Physician: DOROTHY Garza  Principal Problem:Cerebrovascular accident (CVA) due to embolism of right middle cerebral artery    Consults  Subjective:     Chief Complaint:  Stroke     HPI: 71 y/o female with medical Hx as below was brought to ED after being found with slurred speehc and left sided paralysis. Perhaps 6-8 hours had elapsed before pt was found. Per reports pt who had two previous strokes was supposed to be on rivaroxaban and amiodarone but was not taking them.    Past Medical History   Diagnosis Date    Diabetes mellitus     Hypertension        Past Surgical History   Procedure Laterality Date    Cardiac defibrillator placement Left 2011       Review of patient's allergies indicates:  No Known Allergies    Current Neurological Medications:    No current facility-administered medications on file prior to encounter.      Current Outpatient Prescriptions on File Prior to Encounter   Medication Sig    amlodipine (NORVASC) 10 MG tablet Take 10 mg by mouth once daily.    aspirin (ECOTRIN) 81 MG EC tablet Take 81 mg by mouth once daily.    atorvastatin (LIPITOR) 40 MG tablet Take 40 mg by mouth once daily.    glimepiride (AMARYL) 2 MG tablet Take 2 mg by mouth before breakfast.    insulin glargine, TOUJEO, (TOUJEO SOLOSTAR) 300 unit/mL (1.5 mL) InPn pen Inject into the skin.    lisinopril (PRINIVIL,ZESTRIL) 40 MG tablet Take 40 mg by mouth once daily.    amiodarone (PACERONE) 200 MG Tab Take 1 tablet (200 mg total) by mouth once daily.    carvedilol (COREG) 12.5 MG tablet Take 12.5 mg by mouth 2 (two) times daily with meals.    carvedilol (COREG) 6.25 MG tablet Take 6.25 mg by mouth 2 (two) times daily with meals.     metformin (GLUCOPHAGE) 1000 MG tablet Take 1,000 mg by mouth 2 (two) times daily with meals.    rivaroxaban (XARELTO) 15 mg Tab Take 1 tablet (15 mg total) by mouth daily with dinner or evening meal.      Family History     None        Social History Main Topics    Smoking status: Never Smoker    Smokeless tobacco: Not on file    Alcohol use No    Drug use: No    Sexual activity: Not on file     Review of Systems   Unable to provide ROS due to severe dysarthria    Objective:     Vital Signs (Most Recent):  Temp: 96 °F (35.6 °C) (02/19/17 1245)  Pulse: 74 (02/19/17 1829)  Resp: 19 (02/19/17 1829)  BP: 91/80 (02/19/17 1829)  SpO2: (!) 94 % (02/19/17 1829) Vital Signs (24h Range):  Temp:  [96 °F (35.6 °C)-97 °F (36.1 °C)] 96 °F (35.6 °C)  Pulse:  [] 74  Resp:  [11-45] 19  SpO2:  [69 %-100 %] 94 %  BP: ()/() 91/80     Weight: 70.7 kg (155 lb 13.8 oz)  Body mass index is 24.41 kg/(m^2).    Physical Exam  General: well developed  Head: normocephalic  Eyes:  conjunctivae/corneas clear  Nose: no discharge, no epistaxis  Neck: no carotid bruit  Heart: tachycardic  Abdomen: non-distended and bowel sounds normal  Pulses: 2+, symmetric radial pulses  2+ pedal pulses  Neurologic: Mental status: Lethargic; follow simple commands; no verbal interaction  Cranial nerves: II: pupils round at 3 mm, sluggishly reactive to light, no nystagmus VII: left lower facial weakness XI: 5/5 XII: tongue protrudes midline   Strength: decreased tone on left; moves right extremities on command         Significant Labs:   CBC:   Recent Labs  Lab 02/19/17  0218 02/19/17  0819 02/19/17  1146   WBC 6.04 6.86 8.12   HGB 12.9 12.9 13.0   HCT 36.4* 38.7 39.7    271 194  194     CMP:   Recent Labs  Lab 02/19/17  0218 02/19/17  0818   * 160*    143   K 4.0 4.5    113*   CO2 15* 7*   BUN 21 21   CREATININE 1.7* 1.6*   CALCIUM 9.4 8.5*   PROT 6.6 6.3   ALBUMIN 3.2* 2.9*   BILITOT 3.1* 3.5*   ALKPHOS 244* 237*    AST 47* 74*   * 171*   ANIONGAP 19* 23*   EGFRNONAA 30* 32*       Significant Imaging:  Head CT  Mild loss of the gray-white differentiation in the right subinsular cortex and right basal ganglia.  Questionable dense right middle cerebral artery sign.  MRI of the brain may be obtained for further evaluation.    Encephalomalacia in the right anterior cerebral artery and left middle cerebral artery distributions, consistent with remote infarcts.    Partially calcified 2 cm extra-axial mass in the left superior temporal region, consistent with a meningioma.  Outpatient MRI of the brain with contrast may be obtained for confirmation.    The findings were observed at  02:26:51 on Sunday, February 19, 2017 and discussed with  Dr.JONATHAN BERNAL at 02:26:51 on Sunday, February 19, 2017.        Electronically signed by: NATALY SOMMER MD  Date: 02/19/17  Time: 02:31     Assessment and Plan:     71 y/o female with acute stroke    1. Acute stroke: seems right MCA territory. Pt with left hemiplegia. Cardioembolism suspected given pt lack of adherence to amiodarone and rivaroxaban.   -Head CT as above. No signs of ICH. No significant stenosis of ICA's.   -Difficult to to increase BP with fluids. On norepinephrine infusion. Will try to keep SBP > 120.    Amiodarone infusing as well.   -OK with ASA for now.     Active Diagnoses:    Diagnosis Date Noted POA    PRINCIPAL PROBLEM:  Cerebrovascular accident (CVA) due to embolism of right middle cerebral artery [I63.411] 02/19/2017 Yes    Atrial fibrillation with RVR [I48.91] 02/19/2017 Yes    Acute renal failure [N17.9] 02/19/2017 Yes    Diastolic dysfunction with chronic heart failure [I50.32] 02/19/2017 Yes    Essential hypertension [I10] 11/09/2016 Yes    Type 2 diabetes mellitus without complication, without long-term current use of insulin [E11.9] 11/09/2016 Yes    S/P CABG (coronary artery bypass graft) [Z95.1] 11/09/2016 Not Applicable    AICD (automatic  cardioverter/defibrillator) present [Z95.810] 11/09/2016 Yes    Hyperlipidemia [E78.5] 11/09/2016 Yes      Problems Resolved During this Admission:    Diagnosis Date Noted Date Resolved POA       VTE Risk Mitigation         Ordered     High Risk of VTE  Once      02/19/17 0443     Place sequential compression device  Until discontinued      02/19/17 0443          Thank you for your consult. I will follow-up with patient. Please contact us if you have any additional questions.    Gelacio Ríos MD  Neurology  [facility]

## 2017-02-20 NOTE — PLAN OF CARE
Problem: Patient Care Overview  Goal: Plan of Care Review  Outcome: Ongoing (interventions implemented as appropriate)  Patient is obtunded but will open eyes to tactile and repeated stimulation. Patient has right hemiparesis and has expressive aphasia. Dr. Wan has spoke with family and expressed that patient will have a poor prognosis and family has decided to make patient an DNR.  Levophed is at its top limits and there will be no further excalation of care. No falls, or injuries this shift. No skin breakdown . Plan of care reviewed with family    Problem: Infection, Risk/Actual (Adult)  Goal: Identify Related Risk Factors and Signs and Symptoms  Related risk factors and signs and symptoms are identified upon initiation of Human Response Clinical Practice Guideline (CPG)   Outcome: Ongoing (interventions implemented as appropriate)  Patient has no signs or symtoms of infection and is a febrile    Problem: Pressure Ulcer Risk (Izaiah Scale) (Adult,Obstetrics,Pediatric)  Goal: Skin Integrity  Patient will demonstrate the desired outcomes by discharge/transition of care.   Outcome: Ongoing (interventions implemented as appropriate)  Patient skin intact with no skin breakdown

## 2017-02-21 NOTE — DISCHARGE SUMMARY
[facility] Hospital Medicine  Discharge Summary      Patient Name: Elsa Joel  MRN: [id number]  Admission Date: 2/19/2017  Hospital Length of Stay: 1 days  Discharge Date and Time:      Attending Physician: Rosy Wan MD   Discharging Provider: Rosy Wan MD  Primary Care Provider: DOROTHY Garza        HPI:      Elsa Joel is a 72 y.o. female that (in part)  has a past medical history of Diabetes mellitus and Hypertension. Presents to [facility] Emergency Department after being found down in the field. According to EMS reports the patient was lying on the floor on her right side with the family member found her. She had new onset left-sided hemiparesis and dysarthric speech. Last known normal time was approximately 6-8 hours prior to admission. She denied a history of 2 prior CVAs. She was on rivaroxaban and amiodarone, but the patient has not been taking them. Prior to that she was ambulatory at home. The batteries in her AICD are dead and they were scheduled to be changed 5 days from now. The history is otherwise limited due to the condition of the patient.      In the emergency department routine laboratory studies, head CT, and EKG were obtained. There was evidence of right CVA in the middle cerebral artery territory. The EKG revealed atrial fibrillation with rapid ventricular response. She was dysarthric and unable to provide any significant history. She is able to follow simple one-step commands.         Hospital Course: The patient was started on Amiodarone for rate control and IVFs in an attempt to maintain blood pressure.  The patient was completely aphasic and unable to follow verbal commands.  She also had a very dense hemiparesis on the left side.  The patient continued to decrease blood pressure despite aggressive hydration, so she was started on pressor support.  A discussion was held with the patient's son as her prognosis was poor and he  elected to change her status to DNR.  Comfort measures were pursued and the medications were not escalated.  The patient .    Consults:   Consults         Status Ordering Provider     Inpatient consult to Neurology  Once     Provider:  Gelacio Ríos MD    Completed JAYSHREE BERNAL JR          Recent Labs  Lab 17  0218   TROPONINI <0.006     Radiology: MRI: showed    Pending Diagnostic Studies:     None        Final Active Diagnoses:    Diagnosis Date Noted POA    PRINCIPAL PROBLEM:  Cerebrovascular accident (CVA) due to embolism of right middle cerebral artery [I63.411] 2017 Yes    Atrial fibrillation with RVR [I48.91] 2017 Yes    Acute renal failure [N17.9] 2017 Yes    Diastolic dysfunction with chronic heart failure [I50.32] 2017 Yes    Essential hypertension [I10] 2016 Yes    Type 2 diabetes mellitus without complication, without long-term current use of insulin [E11.9] 2016 Yes    S/P CABG (coronary artery bypass graft) [Z95.1] 2016 Not Applicable    AICD (automatic cardioverter/defibrillator) present [Z95.810] 2016 Yes    Hyperlipidemia [E78.5] 2016 Yes      Problems Resolved During this Admission:    Diagnosis Date Noted Date Resolved POA      Discharged Condition:     Disposition:     Follow Up: None    Patient Instructions:   No discharge procedures on file.  Medications:  None (patient  at medical facility)  Time spent on the discharge of patient: 20 minutes    Rosy Wan MD  Department of Hospital Medicine  [facility]

## 2017-02-23 NOTE — PHYSICIAN QUERY
PT Name: Elsa Joel  MR #: [id number]  Physician Query Form - Perfusion Diagnosis Clarification   Reviewer  [email or web address]  This form is a permanent document in the medical record.     Query Date: February 23, 2017  By submitting this query, we are merely seeking further clarification of documentation. Please utilize your independent clinical judgment when addressing the question(s) below.    Indicators Supporting Clinical Findings Location in Medical Record    Sepsis documented       Acidosis documented       ABGs: pH=        pCO2=        HCO3=        HR=       RR=        BP=      Temp=  O2 sat=     x Hypotension or Low Blood Pressure documented Hypotension /tach   hypotensive requiring  IV Boluses ED Note    Confusion or Altered Mental Status   documented     x Oliguria Acute Renal Failure  H&P   x Medication/Treatment:  -Vasopressors  -Inotropic drugs  -IV fluids  -Oxygen  -Cardiac assist devices  -Hemodynamic monitoring  -Blood/Blood products Norephin MAR   x History of AMI, End-Stage Cardiomyopathy, Valve Disorder Ischemic CM Cardio Consult 2/19    Diaphoresis,  cold extremities or cyanosis documented     x CHF documented         EF= CHF -   Ef 20-25 % Cardio Consult 2/19    H&H=       WBC=        Lactic Acid=     x Other: CVA, Atrial Fib,CAD H&P ,Cardio Consult 2/19     Provider, Please specify the diagnosis or diagnoses associated with above clinical findings.    [  x  ] Cardiogenic Shock                 [    ] Hemorrhagic Shock                 [    ] Hypovolemic Shock                 [    ] Septic Shock   [    ] Shock Kidney/Renal  [    ] Shock Liver  [    ] Shock Lung  [    ] Other Shock (Specify): _________________________________  [    ] Other Condition: ___________________________________  [    ] Clinically undetermined